# Patient Record
Sex: MALE | Race: WHITE | NOT HISPANIC OR LATINO | Employment: FULL TIME | URBAN - METROPOLITAN AREA
[De-identification: names, ages, dates, MRNs, and addresses within clinical notes are randomized per-mention and may not be internally consistent; named-entity substitution may affect disease eponyms.]

---

## 2023-12-19 ENCOUNTER — OFFICE VISIT (OUTPATIENT)
Dept: CARDIOLOGY | Facility: CLINIC | Age: 58
End: 2023-12-19
Payer: COMMERCIAL

## 2023-12-19 VITALS
HEART RATE: 96 BPM | DIASTOLIC BLOOD PRESSURE: 96 MMHG | HEIGHT: 68 IN | SYSTOLIC BLOOD PRESSURE: 148 MMHG | BODY MASS INDEX: 36.04 KG/M2 | WEIGHT: 237.8 LBS

## 2023-12-19 DIAGNOSIS — I10 ESSENTIAL HYPERTENSION: Primary | ICD-10-CM

## 2023-12-19 DIAGNOSIS — I48.0 PAF (PAROXYSMAL ATRIAL FIBRILLATION) (MULTI): ICD-10-CM

## 2023-12-19 DIAGNOSIS — E78.2 MIXED HYPERLIPIDEMIA: ICD-10-CM

## 2023-12-19 PROCEDURE — 99204 OFFICE O/P NEW MOD 45 MIN: CPT | Performed by: INTERNAL MEDICINE

## 2023-12-19 PROCEDURE — 99214 OFFICE O/P EST MOD 30 MIN: CPT | Performed by: INTERNAL MEDICINE

## 2023-12-19 PROCEDURE — 1036F TOBACCO NON-USER: CPT | Performed by: INTERNAL MEDICINE

## 2023-12-19 PROCEDURE — 3080F DIAST BP >= 90 MM HG: CPT | Performed by: INTERNAL MEDICINE

## 2023-12-19 PROCEDURE — 3077F SYST BP >= 140 MM HG: CPT | Performed by: INTERNAL MEDICINE

## 2023-12-19 RX ORDER — ATENOLOL 50 MG/1
TABLET ORAL EVERY 24 HOURS
COMMUNITY
End: 2023-12-19 | Stop reason: SINTOL

## 2023-12-19 RX ORDER — NEBIVOLOL 5 MG/1
5 TABLET ORAL DAILY
Qty: 30 TABLET | Refills: 11 | Status: SHIPPED | OUTPATIENT
Start: 2023-12-19 | End: 2024-12-18

## 2023-12-19 RX ORDER — OMEGA-3 FATTY ACIDS 1000 MG
1 CAPSULE ORAL DAILY
COMMUNITY

## 2023-12-19 RX ORDER — ASPIRIN 81 MG/1
81 TABLET ORAL
COMMUNITY
Start: 2020-01-02

## 2023-12-19 RX ORDER — PANTOPRAZOLE SODIUM 40 MG/1
TABLET, DELAYED RELEASE ORAL EVERY 24 HOURS
COMMUNITY
Start: 2023-06-27

## 2023-12-19 RX ORDER — OXAPROZIN 600 MG/1
600 TABLET, FILM COATED ORAL
COMMUNITY
Start: 2023-04-20

## 2023-12-19 RX ORDER — OLMESARTAN MEDOXOMIL 40 MG/1
TABLET ORAL EVERY 24 HOURS
COMMUNITY
Start: 2023-03-16

## 2023-12-19 RX ORDER — ALBUTEROL SULFATE 90 UG/1
2 AEROSOL, METERED RESPIRATORY (INHALATION) EVERY 4 HOURS PRN
COMMUNITY
Start: 2022-05-04

## 2023-12-19 NOTE — PROGRESS NOTES
Chief Complaint:   Atrial fibrillation     History of Present Illness     Carl Murphy is a 58 y.o. male presenting with for evaluation of new-onset paroxysmal atrial fibrillation last month.  The patient initially presented with symptoms of palpitations, fatigue, POPE.  Went to the ER and converted to SR with beta blocker.  The patient has been maintaining sinus rhythm on medical treatment which they are tolerating well and are compliant.  The current treatment strategy is rhythm control.  The CHADS2-VASC2 score is 1  and the ACC/AHA guidelines recommend +/- anticoagulation (ASA or OAC) for stroke prophylaxis.  The patient is being treated with aspirin and has tolerated treatment with no bleeding and is compliant.  No recurrence. No cardiac Hx.  Was drinking a Monster or pre-work out daily. Takes TRT.  No Hx CAD, MI, CVA. EtOH x 2 drinks, 3 times a week. Longstanding HTN.  No known FH of AF.    Review of Systems  All pertinent systems have been reviewed and are negative except for what is stated in the history of present illness.    All other systems have been reviewed and are negative and noncontributory to this patient's current ailments.   .       Previous History     Past Medical History:  He has a past medical history of Essential hypertension (12/19/2023), Mixed hyperlipidemia (12/19/2023), and PAF (paroxysmal atrial fibrillation) (CMS/HCC) (12/19/2023).    Past Surgical History:  He has no past surgical history on file.      Social History:  He reports that he has never smoked. He has never used smokeless tobacco. No history on file for alcohol use and drug use.    Family History:  Family History   Problem Relation Name Age of Onset    Hypertension Mother      Hyperlipidemia Mother      Heart attack Father          Allergies:  Iodine and Pravastatin sodium    Outpatient Medications:  Current Outpatient Medications   Medication Instructions    albuterol 90 mcg/actuation inhaler 2 puffs, inhalation, Every  "4 hours PRN    aspirin 81 mg, oral, Daily RT    atenolol (Tenormin) 50 mg tablet Every 24 hours    olmesartan (BENIcar) 40 mg tablet Every 24 hours    omega-3 1,000 mg capsule capsule 1 tablet, oral, Daily    oxaprozin (DAYPRO) 600 mg, oral, 2 times daily with meals    pantoprazole (ProtoNix) 40 mg EC tablet Every 24 hours       Physical Examination   Vitals:  Visit Vitals  BP (!) 148/96 (BP Location: Right arm, Patient Position: Sitting, BP Cuff Size: Large adult)   Pulse 96   Ht 1.727 m (5' 8\")   Wt 108 kg (237 lb 12.8 oz)   BMI 36.16 kg/m²   Smoking Status Never   BSA 2.28 m²      Physical Exam  Vitals reviewed.   Constitutional:       General: He is not in acute distress.     Appearance: Normal appearance.   HENT:      Head: Normocephalic and atraumatic.      Nose: Nose normal.   Eyes:      Conjunctiva/sclera: Conjunctivae normal.   Cardiovascular:      Rate and Rhythm: Normal rate and regular rhythm.      Pulses: Normal pulses.      Heart sounds: No murmur heard.  Pulmonary:      Effort: Pulmonary effort is normal. No respiratory distress.      Breath sounds: Normal breath sounds. No wheezing, rhonchi or rales.   Abdominal:      General: Bowel sounds are normal. There is no distension.      Palpations: Abdomen is soft.      Tenderness: There is no abdominal tenderness.   Musculoskeletal:         General: No swelling.      Right lower leg: No edema.      Left lower leg: No edema.   Skin:     General: Skin is warm and dry.      Capillary Refill: Capillary refill takes less than 2 seconds.   Neurological:      General: No focal deficit present.      Mental Status: He is alert.   Psychiatric:         Mood and Affect: Mood normal.           Labs/Imaging/Cardiac Studies     Last Labs:  CBC -  No results found for: \"WBC\", \"HGB\", \"HCT\", \"MCV\", \"PLT\"    CMP -  No results found for: \"CALCIUM\", \"PHOS\", \"PROT\", \"ALBUMIN\", \"AST\", \"ALT\", \"ALKPHOS\", \"BILITOT\"    LIPID PANEL -   No results found for: \"CHOL\", \"HDL\", \"CHHDL\", " "\"LDL\", \"VLDL\", \"TRIG\", \"NHDL\"    RENAL FUNCTION PANEL -   No results found for: \"GLU\", \"K\", \"CHLOR\", \"PHOS\"    Lab Results   Component Value Date    HGBA1C 4.9 12/13/2021     CT CARDIAC SCORING;  2/18/2022 8:13 am     INDICATION:  G93.2 Benign intracranial hypertension.     COMPARISON:  None.     ACCESSION NUMBER(S):  [id number]     ORDERING CLINICIAN:  JOSE ROBERTO MARTINEZ     TECHNIQUE:  Using prospective ECG gating, CT scan of the coronary arteries was  performed without intravenous contrast. Coronary calcium scoring  was  performed according to the method of Agatston.     FINDINGS:  The score and distribution of calcium in the coronary arteries is as  follows:     LM 10.49,  .77,  LCx 0,  RCA 0,     Total   132.26     The visualized mid/lower ascending thoracic aorta, is normal in size,  measuring 34 mm in diameter. The heart is normal in size. No  pericardial effusion is present.Main pulmonary artery is normal in  caliber.     There is no evidence of lymphadenopathy or mass within the visualized  mediastinum.The visualized esophagus is unremarkable.     The visualized segments of the lungsare normally expanded. There is  parenchymal density along a right middle lobe bronchovascular bundle  extending outward, with slight associated bronchiectasis, most likely  scarring.     The visualized subdiaphragmatic structures demonstrate no remarkable  findings.        IMPRESSION:  Parenchymal density surrounding a bronchovascular bundle in the right  middle lobe extending outward, with slight associated bronchiectasis,  most likely scarring.  ECG:    Echo:  No echocardiogram results found for the past 12 months       Assessment and Recommendations     Assessment/Plan   1. Essential hypertension  Suboptimal control and side effects on atenolol.  Change to Bystolic.  - Transthoracic echo (TTE) complete; Future    2. Mixed hyperlipidemia  Hyperlipidemia: The patient's lipids are well controlled on chronic statin therapy and they " are meeting their goal LDL cholesterol per the ACC/AHA guidelines.  The patient is compliant and tolerating statin therapy and is following a heart health diet and performs regular exercise.  The benefits of lipid lowering therapy have been discussed with the patient/family/caregiver.     - Transthoracic echo (TTE) complete; Future    3. PAF (paroxysmal atrial fibrillation) (CMS/[facility])  Paroxysmal trial fibrillation: The patient has been clinically stable, asymptomatic, and maintaining normal sinus rhythm.  They will continue treatment with rate-controlling medications and ASA for stroke prophylaxis based upon their present [id number] score, the risks and benefits of which were discussed with the patient/family/caregiver.   - Transthoracic echo (TTE) complete; Future       Darion Morris MD    Exclusive of any other services or procedures performed, I, Darion Morris MD , spent 30 minutes in duration for this visit today.  This time consisted of chart review, obtaining history, and/or performing the exam as documented above as well as documenting the clinical information for the encounter in the electronic record, discussing treatment options, plans, and/or goals with patient, family, and/or caregiver, refilling medications, updating the electronic record, ordering medicines, lab work, imaging, referrals, and/or procedures as documented above and communicating with other [facility] professionals. I have discussed the results of laboratory, radiology, and cardiology studies with the patient and their family/caregiver.

## 2023-12-28 ENCOUNTER — TELEPHONE (OUTPATIENT)
Dept: CARDIOLOGY | Facility: CLINIC | Age: 58
End: 2023-12-28
Payer: COMMERCIAL

## 2023-12-28 ENCOUNTER — HOSPITAL ENCOUNTER (OUTPATIENT)
Dept: CARDIOLOGY | Facility: CLINIC | Age: 58
Discharge: HOME | End: 2023-12-28
Payer: COMMERCIAL

## 2023-12-28 VITALS — SYSTOLIC BLOOD PRESSURE: 161 MMHG | DIASTOLIC BLOOD PRESSURE: 106 MMHG

## 2023-12-28 DIAGNOSIS — E78.2 MIXED HYPERLIPIDEMIA: ICD-10-CM

## 2023-12-28 DIAGNOSIS — I48.0 PAF (PAROXYSMAL ATRIAL FIBRILLATION) (MULTI): ICD-10-CM

## 2023-12-28 DIAGNOSIS — I10 ESSENTIAL HYPERTENSION: ICD-10-CM

## 2023-12-28 LAB
AORTIC VALVE PEAK VELOCITY: 1.81
AV PEAK GRADIENT: 13.1
AVA (PEAK VEL): 1.74
EJECTION FRACTION APICAL 4 CHAMBER: 53.5
EJECTION FRACTION: 53
GLOBAL LONGITUDINAL STRAIN: -13.5
LEFT ATRIUM VOLUME AREA LENGTH INDEX BSA: 27.9
LEFT VENTRICLE INTERNAL DIMENSION DIASTOLE: 4.2 (ref 3.5–6)
LEFT VENTRICULAR OUTFLOW TRACT DIAMETER: 1.97
MITRAL VALVE E/A RATIO: 0.86
MITRAL VALVE E/E' RATIO: 11.09
RIGHT VENTRICLE FREE WALL PEAK S': 13
TRICUSPID ANNULAR PLANE SYSTOLIC EXCURSION: 2.2

## 2023-12-28 PROCEDURE — 93306 TTE W/DOPPLER COMPLETE: CPT

## 2023-12-28 PROCEDURE — 93306 TTE W/DOPPLER COMPLETE: CPT | Performed by: INTERNAL MEDICINE

## 2023-12-28 NOTE — TELEPHONE ENCOUNTER
Spoke with pt and went over stress prep  Npo 2 hrs prior,   Hold nebivolol 24 hrs prior  Pt verbalized understanding

## 2024-01-02 ENCOUNTER — APPOINTMENT (OUTPATIENT)
Dept: CARDIOLOGY | Facility: CLINIC | Age: 59
End: 2024-01-02
Payer: COMMERCIAL

## 2024-01-04 ENCOUNTER — TELEPHONE (OUTPATIENT)
Dept: CARDIOLOGY | Facility: CLINIC | Age: 59
End: 2024-01-04
Payer: COMMERCIAL

## 2024-02-19 ENCOUNTER — APPOINTMENT (OUTPATIENT)
Dept: ENDOCRINOLOGY | Facility: CLINIC | Age: 59
End: 2024-02-19
Payer: COMMERCIAL